# Patient Record
Sex: MALE | HISPANIC OR LATINO | URBAN - METROPOLITAN AREA
[De-identification: names, ages, dates, MRNs, and addresses within clinical notes are randomized per-mention and may not be internally consistent; named-entity substitution may affect disease eponyms.]

---

## 2020-10-01 NOTE — IMPRESSION/PLAN
Impression: Vitreous degeneration, bilateral: H43.813. Plan: Discussed with patient risk of RD/retinal breaks. Will continue to monitor.

## 2020-10-01 NOTE — IMPRESSION/PLAN
Impression: Type 2 diabetes mellitus w/o complication: U46.5. Plan: Discussed with patient the importance of glucose control and ocular risk. 

Follow up annually with dilated fundus exam.

## 2020-10-01 NOTE — IMPRESSION/PLAN
Impression: Dermatochalasis of left upper eyelid: H02.834. Plan: Discussed with patient. blink exercises AT PRN until seen by Dr. Geraldine Martin.  

RTC 2-3 weeks with Dr. Hitesh Covington

## 2020-11-03 NOTE — IMPRESSION/PLAN
Impression: Dermatochalasis of right upper eyelid: H02.831. Plan: Discussed diagnosis in detail with patient. Discussed treatment plan with patient. Patient to return to clinic in 2-3weeks for repeat visual field, taped. Will reevaluate for further surgical intervention, at that time. *Patient is currently being treated during COVID-19 epidemic, which limits our availability to establish proper follow up care. Patient understands these limitations, and is aware that we will continue treatment and follow up based on our availability, as determined by local state and federal guidelines.

## 2020-11-03 NOTE — ASSESSMENT/PLAN
Impression: Visual Field - OD: Fair-Untaped: 20 degrees of superior isopter visual field obstruction Taped: no improved visual field obstruction; OS: Fair-Untaped: 20 degrees of superior isopter visual field obstruction Taped: no improved visual field obstruction Plan: See plan #1.

## 2020-12-01 NOTE — IMPRESSION/PLAN
Impression: Dermatochalasis of right upper eyelid: H02.831. Plan: Discussed diagnosis in detail with patient. Discussed treatment options with patient. Visual Field reviewed with patient in clinic today. Patient understands per visual field, condition is not visually significant to be covered by insurance. Recommend 1 year Lid Eval with Visual Field, or patient can opt for cosmetic surgery. Patient will consider cosmetic BULB and will call back if/or when, ready to schedule. RL2. Patient agrees to schedule a cataract eval, in effort to correct vision. *Patient is currently being treated during COVID-19 epidemic, which limits our availability to establish proper follow up care. Patient understands these limitations, and is aware that we will continue treatment and follow up based on our availability, as determined by local state and federal guidelines.

## 2020-12-01 NOTE — ASSESSMENT/PLAN
Impression: Visual Field - OD: Poor-Untaped: 20 degrees of superior isopter visual field obstruction; Taped: no improvement of visual field obstruction; OS: Poor-Untaped: 20 degrees of superior isopter visual field obstruction; Taped: no improvement of visual field obstruction Plan: See plan #1.

## 2021-01-07 NOTE — IMPRESSION/PLAN
Impression: Combined forms of age-related cataract, bilateral: H25.813. Plan: The patient has a visually significant cataract in both eyes. After discussion with the patient and careful examination it has been determined that a cataract in both eyes is accounting for a significant amount of the patient's visual symptoms. Cataract surgery and the associated risks, benefits, alternatives, expectations, and recovery were discussed in detail with the patient. All questions were answered. The patient understands that there may be some limitation in visual potential given any pre-existing ocular disease. Discussed option of eye drops with patient, patient elects Prednisolone, Ketorolac and Ofloxacin   , discussed possible side effects of drops. The patient desires cataract surgery in both eyes. Schedule cataract surgery in both eyes; left eye, then right eye. RL 2 Patient is a candidate for standard Surgeon: Stephen Gunter

## 2021-01-20 NOTE — IMPRESSION/PLAN
Impression: S/P Cataract Extraction by phacoemulsification with IOL placement OS - 1 Day. Encounter for surgical aftercare following surgery on a sense organ  Z48.810. Plan: Use AT PRN Start NSAID, AB, Steroid QID OS Restrictions discusses, RD precautions RTO 1 week PO2

## 2021-01-27 NOTE — IMPRESSION/PLAN
Impression: S/P Cataract Extraction by phacoemulsification with IOL placement OS - 8 Days. Encounter for surgical aftercare following surgery on a sense organ  Z48.810. Excellent post op course   Post operative instructions reviewed - Plan: AT PRN Stop Antibiotic, NSAID OS Cont steroid TID x 1 week, BID x 1 week, then QD x 1 week, then D/C OS Disc restrictions off, RD precautions RTO 3 weeks for PO3

## 2021-02-03 NOTE — IMPRESSION/PLAN
Impression: S/P Cataract Extraction by phacoemulsification with IOL placement OD - 1 Day. Presence of intraocular lens  Z96.1. Plan: Advised patient to use Oflox/Pred/Ket QID OD and QD OS
went over precautions with patient in room RTC 1wk PO2

## 2021-02-10 NOTE — IMPRESSION/PLAN
Impression: S/P Cataract Extraction by phacoemulsification with IOL placement OD - 8 Days. Presence of intraocular lens  Z96.1. Excellent post op course Plan: Discussed post operative drops with patient.  Advised taper of Prednisolone on Left eye

## 2021-03-03 NOTE — IMPRESSION/PLAN
Impression: S/P Cataract Extraction by phacoemulsification with IOL placement OD - 29 Days. Presence of intraocular lens  Z96.1. Excellent post op course   Post operative instructions reviewed - Plan: Patient recommended to use warm compresses QD -BID OU. Patient to start using AFT BID-TID OU. Patient to call office to make appointment if symptoms persist. 
D/c Pred RTC 6 months for Capsule check DFE.

## 2021-09-03 NOTE — IMPRESSION/PLAN
Impression: Dry eye syndrome of bilateral lacrimal glands: H04.123. Plan: Discussed with patient. Recommend 2000 mg Omega-3 Fatty Acid, blink exercises, warm compress, and Artificial Tears BID. 

If vision is not improved with Dry eye treatment, may consider referral for bleph

## 2021-09-03 NOTE — IMPRESSION/PLAN
Impression: Type 2 diabetes mellitus w/o complication: C45.2. Plan: No diabetic retinopathy present on exam. No treatment is needed. Discussed with patient the importance of glucose control and ocular risk to prevent the progression to Retinopathy.  

Follow up annually with dilated fundus exam.

## 2021-09-03 NOTE — IMPRESSION/PLAN
Impression: Dermatochalasis of right upper eyelid: H02.831. Plan: Discussed with patient. May consider referral to Dr. Pallavi Lopez after next appointment.

## 2021-09-03 NOTE — IMPRESSION/PLAN
Impression: Other secondary cataract of left eye: H26.492. Plan: Trace PC Haze. Not visually significant. Will monitor at this time. Informed him to call the office with decreased vision or increased glare.

## 2022-09-06 ENCOUNTER — OFFICE VISIT (OUTPATIENT)
Dept: URBAN - METROPOLITAN AREA CLINIC 48 | Facility: CLINIC | Age: 62
End: 2022-09-06
Payer: COMMERCIAL

## 2022-09-06 DIAGNOSIS — H04.123 DRY EYE SYNDROME OF BILATERAL LACRIMAL GLANDS: ICD-10-CM

## 2022-09-06 DIAGNOSIS — H02.831 DERMATOCHALASIS OF RIGHT UPPER EYELID: ICD-10-CM

## 2022-09-06 DIAGNOSIS — E11.9 TYPE 2 DIABETES MELLITUS W/O COMPLICATION: Primary | ICD-10-CM

## 2022-09-06 DIAGNOSIS — H02.834 DERMATOCHALASIS OF LEFT UPPER EYELID: ICD-10-CM

## 2022-09-06 PROCEDURE — 99214 OFFICE O/P EST MOD 30 MIN: CPT | Performed by: STUDENT IN AN ORGANIZED HEALTH CARE EDUCATION/TRAINING PROGRAM

## 2022-09-06 ASSESSMENT — INTRAOCULAR PRESSURE
OD: 16
OS: 18

## 2022-09-06 NOTE — IMPRESSION/PLAN
Impression: Type 2 diabetes mellitus w/o complication: V51.8.  Plan: - No retinopathy seen on exam today
- Continue glucose, BP, and lipid control as per PCP
- Continue with annual DFE

## 2022-09-06 NOTE — IMPRESSION/PLAN
Impression: Dry eye syndrome of bilateral lacrimal glands: H04.123. Plan: Recommend OTC AFT up to QID for dry eye symptoms - List of common brand AFT names provided for pt.

## 2022-09-06 NOTE — IMPRESSION/PLAN
Impression: Dermatochalasis of right upper eyelid: H02.831. Plan: Discussed options with pt. will monitor with annual DFE.